# Patient Record
Sex: MALE | Race: BLACK OR AFRICAN AMERICAN | Employment: UNEMPLOYED | URBAN - METROPOLITAN AREA
[De-identification: names, ages, dates, MRNs, and addresses within clinical notes are randomized per-mention and may not be internally consistent; named-entity substitution may affect disease eponyms.]

---

## 2017-01-17 ENCOUNTER — HOSPITAL ENCOUNTER (EMERGENCY)
Age: 27
Discharge: HOME | End: 2017-01-17
Attending: EMERGENCY MEDICINE
Payer: SELF-PAY

## 2017-01-17 VITALS
RESPIRATION RATE: 14 BRPM | OXYGEN SATURATION: 99 % | DIASTOLIC BLOOD PRESSURE: 68 MMHG | HEART RATE: 83 BPM | SYSTOLIC BLOOD PRESSURE: 119 MMHG | TEMPERATURE: 98.1 F

## 2017-01-17 DIAGNOSIS — H10.33 ACUTE CONJUNCTIVITIS OF BOTH EYES, UNSPECIFIED ACUTE CONJUNCTIVITIS TYPE: Primary | ICD-10-CM

## 2017-01-17 PROCEDURE — 99282 EMERGENCY DEPT VISIT SF MDM: CPT

## 2017-01-17 RX ORDER — POLYMYXIN B SULFATE AND TRIMETHOPRIM 1; 10000 MG/ML; [USP'U]/ML
1 SOLUTION OPHTHALMIC EVERY 4 HOURS
Qty: 10 ML | Refills: 0 | Status: SHIPPED | OUTPATIENT
Start: 2017-01-17 | End: 2017-01-27

## 2017-01-18 NOTE — ED PROVIDER NOTES
HPI Comments: 8:24 PM Sepideh Driscoll is a 23 y.o. male who presents to the ED c/o bilateral eye irritation onset one day ago. Sx include watering of the eyes and scratchy/irritating sensation. Denies intense pain, change in vision, wearing contact lenses, using power tools, sanding, or foreign objects in th eyes. States his son had the same sx a few days ago. Denies fevers or chills. No further complaints at this time. The history is provided by the patient. History reviewed. No pertinent past medical history. History reviewed. No pertinent past surgical history. History reviewed. No pertinent family history. Social History     Social History    Marital status: SINGLE     Spouse name: N/A    Number of children: N/A    Years of education: N/A     Occupational History    Not on file. Social History Main Topics    Smoking status: Never Smoker    Smokeless tobacco: Not on file    Alcohol use Yes      Comment: occassionally    Drug use: No    Sexual activity: Yes     Partners: Female     Birth control/ protection: None     Other Topics Concern    Not on file     Social History Narrative         ALLERGIES: Review of patient's allergies indicates no known allergies. Review of Systems   Constitutional: Negative for chills and fever. HENT: Negative for congestion and rhinorrhea. Eyes: Positive for redness and itching. Negative for visual disturbance. (+) irritation    Respiratory: Negative for cough and shortness of breath. Cardiovascular: Negative for chest pain and leg swelling. Gastrointestinal: Negative for abdominal pain and nausea. Genitourinary: Negative for dysuria and hematuria. Musculoskeletal: Negative for arthralgias and myalgias. Skin: Negative for rash and wound. Neurological: Negative for light-headedness and headaches. Psychiatric/Behavioral: Negative for confusion and hallucinations.        Vitals:    01/17/17 2020   BP: 119/68   Pulse: 83 Resp: 14   Temp: 98.1 °F (36.7 °C)   SpO2: 99%            Physical Exam   Constitutional: He is oriented to person, place, and time. He appears well-developed and well-nourished. No distress. HENT:   Head: Normocephalic and atraumatic. Eyes: EOM are normal. Pupils are equal, round, and reactive to light. Right eye exhibits discharge (clear). Left eye exhibits discharge (clear ). Right conjunctiva is injected. Left conjunctiva is injected. Full extraocular movements with no pain   Globes are soft   No proptosis   No photophobia on exam    Neck: Neck supple. No JVD present. No thyromegaly present. Cardiovascular: Normal rate, regular rhythm, S1 normal and S2 normal.    Pulmonary/Chest: Effort normal. No accessory muscle usage. No respiratory distress. Abdominal: Soft. Normal appearance. He exhibits no distension. There is no tenderness. There is no rigidity, no rebound and no guarding. Musculoskeletal: Normal range of motion. He exhibits no edema or tenderness. Neurological: He is alert and oriented to person, place, and time. He has normal strength. No cranial nerve deficit or sensory deficit. Coordination normal.   Skin: Skin is warm and intact. No rash noted. Psychiatric: He has a normal mood and affect. His speech is normal and behavior is normal.   Vitals reviewed. MDM  Number of Diagnoses or Management Options  Acute conjunctivitis of both eyes, unspecified acute conjunctivitis type:   Diagnosis management comments: Gail Sam is a 23 y.o. Male coming in with BL conjunctival injection and clear otorrhea. No symptoms consistent with abrasion, FB, glaucoma, preseptal or orbital cellulitis. Son had similar symptoms. Will d/c home with topical abx and supportive care.     ED Course       Procedures      Vitals:  Patient Vitals for the past 12 hrs:   Temp Pulse Resp BP SpO2   01/17/17 2020 98.1 °F (36.7 °C) 83 14 119/68 99 %     99% on RA, indicating adequate oxygenation. Disposition:  Diagnosis:   1. Acute conjunctivitis of both eyes, unspecified acute conjunctivitis type        Disposition: Discharge     Follow-up Information     Follow up With Details Comments [place] in 1 week As needed, If symptoms worsen [address] ([facility]) [id number]  [phone]    [facility] EMERGENCY DEPT  As needed, If symptoms worsen [address] [facility] [address]  [phone]           Patient's Medications   Start Taking    TRIMETHOPRIM-POLYMYXIN B (POLYTRIM) OPHTHALMIC SOLUTION    Administer 1 Drop to both eyes every four (4) hours for 10 days. Continue Taking    No medications on file   These Medications have changed    No medications on file   Stop Taking    No medications on file       SCRIBE ATTESTATION STATEMENT  Documented by: Rosy Dawson scribing for, and in the presence of, Liza Blair MD 8:28 PM     Signed by: Gume Montoya, 1/17/17, 8:28 PM    PROVIDER ATTESTATION STATEMENT  I personally performed the services described in the documentation, reviewed the documentation, as recorded by the scribe in my presence, and it accurately and completely records my words and actions.   Liza Blair MD

## 2017-01-18 NOTE — DISCHARGE INSTRUCTIONS
Pinkeye: Care Instructions  Your Care Instructions    Pinkeye is redness and swelling of the eye surface and the conjunctiva (the lining of the eyelid and the covering of the white part of the eye). Pinkeye is also called conjunctivitis. Pinkeye is often caused by infection with bacteria or a virus. Dry air, allergies, smoke, and chemicals are other common causes. Pinkeye often clears on its own in 7 to 10 days. Antibiotics only help if the pinkeye is caused by bacteria. Pinkeye caused by infection spreads easily. If an allergy or chemical is causing pinkeye, it will not go away unless you can avoid whatever is causing it. Follow-up care is a key part of your treatment and safety. Be sure to make and go to all appointments, and call your doctor if you are having problems. Its also a good idea to know your test results and keep a list of the medicines you take. How can you care for yourself at home? · Wash your hands often. Always wash them before and after you treat pinkeye or touch your eyes or face. · Use moist cotton or a clean, wet cloth to remove crust. Wipe from the inside corner of the eye to the outside. Use a clean part of the cloth for each wipe. · Put cold or warm wet cloths on your eye a few times a day if the eye hurts. · Do not wear contact lenses or eye makeup until the pinkeye is gone. Throw away any eye makeup you were using when you got pinkeye. Clean your contacts and storage case. If you wear disposable contacts, use a new pair when your eye has cleared and it is safe to wear contacts again. · If the doctor gave you antibiotic ointment or eyedrops, use them as directed. Use the medicine for as long as instructed, even if your eye starts looking better soon. Keep the bottle tip clean, and do not let it touch the eye area. · To put in eyedrops or ointment:  ¨ Tilt your head back, and pull your lower eyelid down with one finger.   ¨ Drop or squirt the medicine inside the lower lid.  ¨ Close your eye for 30 to 60 seconds to let the drops or ointment move around. ¨ Do not touch the ointment or dropper tip to your eyelashes or any other surface. · Do not share towels, pillows, or washcloths while you have pinkeye. When should you call for help? Call your doctor now or seek immediate medical care if:  · You have pain in your eye, not just irritation on the surface. · You have a change in vision or loss of vision. · You have an increase in discharge from the eye. · Your eye has not started to improve or begins to get worse within 48 hours after you start using antibiotics. · Pinkeye lasts longer than 7 days. Watch closely for changes in your health, and be sure to contact your doctor if you have any problems. Where can you learn more? Go to http://heavenly-lianet.info/. Enter Y392 in the search box to learn more about \"Pinkeye: Care Instructions. \"  Current as of: May 27, 2016  Content Version: 11.1  © 3157-3483 [employer]. Care instructions adapted under license by [employer] (which disclaims liability or warranty for this information). If you have questions about a medical condition or this instruction, always ask your healthcare professional. [address] any warranty or liability for your use of this information.

## 2017-01-18 NOTE — ED NOTES
Per pt, \"I think I got pink eye (both eyes red and watery). \" Pt states that he first got sx 4 days ago and his son recently dx with pink eye

## 2017-01-18 NOTE — ED NOTES
I have reviewed discharge instructions with the patient. The patient verbalized understanding. Pt given both verbal and written D/C information and 1 Rx. Pt understands to F/U in ED for any new or worsening symptoms. Pt leaving ED now in stable condition, ambulatory, w/ no further questions or concerns at this time.